# Patient Record
Sex: FEMALE | Employment: FULL TIME | URBAN - METROPOLITAN AREA
[De-identification: names, ages, dates, MRNs, and addresses within clinical notes are randomized per-mention and may not be internally consistent; named-entity substitution may affect disease eponyms.]

---

## 2021-07-07 ENCOUNTER — OFFICE VISIT (OUTPATIENT)
Dept: FAMILY MEDICINE | Facility: CLINIC | Age: 36
End: 2021-07-07
Payer: COMMERCIAL

## 2021-07-07 VITALS
DIASTOLIC BLOOD PRESSURE: 78 MMHG | RESPIRATION RATE: 16 BRPM | OXYGEN SATURATION: 99 % | SYSTOLIC BLOOD PRESSURE: 124 MMHG | TEMPERATURE: 97.7 F | BODY MASS INDEX: 20.58 KG/M2 | HEART RATE: 61 BPM | HEIGHT: 61 IN | WEIGHT: 109 LBS

## 2021-07-07 DIAGNOSIS — L84 CORNS OF MULTIPLE TOES: ICD-10-CM

## 2021-07-07 DIAGNOSIS — F41.9 ANXIETY: ICD-10-CM

## 2021-07-07 DIAGNOSIS — Z00.00 ROUTINE GENERAL MEDICAL EXAMINATION AT A HEALTH CARE FACILITY: Primary | ICD-10-CM

## 2021-07-07 DIAGNOSIS — M21.611 BILATERAL BUNIONS: ICD-10-CM

## 2021-07-07 DIAGNOSIS — Z12.4 SCREENING FOR CERVICAL CANCER: ICD-10-CM

## 2021-07-07 DIAGNOSIS — J45.20 ASTHMA, WELL CONTROLLED, MILD INTERMITTENT: ICD-10-CM

## 2021-07-07 DIAGNOSIS — M21.612 BILATERAL BUNIONS: ICD-10-CM

## 2021-07-07 DIAGNOSIS — T83.32XA INTRAUTERINE CONTRACEPTIVE DEVICE THREADS LOST, INITIAL ENCOUNTER: ICD-10-CM

## 2021-07-07 PROBLEM — Z97.5 IUD (INTRAUTERINE DEVICE) IN PLACE: Status: ACTIVE | Noted: 2021-07-07

## 2021-07-07 PROCEDURE — G0145 SCR C/V CYTO,THINLAYER,RESCR: HCPCS | Performed by: FAMILY MEDICINE

## 2021-07-07 PROCEDURE — 99213 OFFICE O/P EST LOW 20 MIN: CPT | Mod: 25 | Performed by: FAMILY MEDICINE

## 2021-07-07 PROCEDURE — 99385 PREV VISIT NEW AGE 18-39: CPT | Performed by: FAMILY MEDICINE

## 2021-07-07 PROCEDURE — 96127 BRIEF EMOTIONAL/BEHAV ASSMT: CPT | Performed by: FAMILY MEDICINE

## 2021-07-07 PROCEDURE — 87624 HPV HI-RISK TYP POOLED RSLT: CPT | Performed by: FAMILY MEDICINE

## 2021-07-07 RX ORDER — ALBUTEROL SULFATE 90 UG/1
2 AEROSOL, METERED RESPIRATORY (INHALATION) EVERY 6 HOURS PRN
Qty: 18 G | Refills: 1 | COMMUNITY
Start: 2021-07-07

## 2021-07-07 ASSESSMENT — ENCOUNTER SYMPTOMS
EYE PAIN: 0
DIARRHEA: 0
SORE THROAT: 0
PALPITATIONS: 0
FEVER: 0
FREQUENCY: 0
NERVOUS/ANXIOUS: 0
COUGH: 0
PARESTHESIAS: 0
MYALGIAS: 0
HEADACHES: 0
CHILLS: 0
HEMATOCHEZIA: 0
DIZZINESS: 0
BREAST MASS: 0
ARTHRALGIAS: 0
ABDOMINAL PAIN: 0
NAUSEA: 0
CONSTIPATION: 0
HEMATURIA: 0
SHORTNESS OF BREATH: 0
DYSURIA: 0
HEARTBURN: 0
JOINT SWELLING: 0
WEAKNESS: 0

## 2021-07-07 ASSESSMENT — ANXIETY QUESTIONNAIRES
GAD7 TOTAL SCORE: 5
3. WORRYING TOO MUCH ABOUT DIFFERENT THINGS: NOT AT ALL
1. FEELING NERVOUS, ANXIOUS, OR ON EDGE: SEVERAL DAYS
5. BEING SO RESTLESS THAT IT IS HARD TO SIT STILL: NOT AT ALL
6. BECOMING EASILY ANNOYED OR IRRITABLE: SEVERAL DAYS
7. FEELING AFRAID AS IF SOMETHING AWFUL MIGHT HAPPEN: MORE THAN HALF THE DAYS
IF YOU CHECKED OFF ANY PROBLEMS ON THIS QUESTIONNAIRE, HOW DIFFICULT HAVE THESE PROBLEMS MADE IT FOR YOU TO DO YOUR WORK, TAKE CARE OF THINGS AT HOME, OR GET ALONG WITH OTHER PEOPLE: SOMEWHAT DIFFICULT
2. NOT BEING ABLE TO STOP OR CONTROL WORRYING: SEVERAL DAYS

## 2021-07-07 ASSESSMENT — MIFFLIN-ST. JEOR: SCORE: 1118.86

## 2021-07-07 ASSESSMENT — PATIENT HEALTH QUESTIONNAIRE - PHQ9: 5. POOR APPETITE OR OVEREATING: NOT AT ALL

## 2021-07-07 NOTE — LETTER
My Asthma Action Plan    Name: Jovita Lim   YOB: 1985  Date: 7/7/2021   My doctor: Myah Edgar MD   My clinic: [facility]        My Rescue Medicine:   Albuterol inhaler (Proair/Ventolin/Proventil HFA)  2-4 puffs EVERY 4 HOURS as needed. Use a spacer if recommended by your provider.   My Asthma Severity:   Intermittent / Exercise Induced  Know your asthma triggers: upper respiratory infections and exercise or sports             GREEN ZONE   Good Control    I feel good    No cough or wheeze    Can work, sleep and play without asthma symptoms       Take your asthma control medicine every day.     1. If exercise triggers your asthma, take your rescue medication    15 minutes before exercise or sports, and    During exercise if you have asthma symptoms  2. Spacer to use with inhaler: If you have a spacer, make sure to use it with your inhaler             YELLOW ZONE Getting Worse  I have ANY of these:    I do not feel good    Cough or wheeze    Chest feels tight    Wake up at night   1. Keep taking your Green Zone medications  2. Start taking your rescue medicine:    every 20 minutes for up to 1 hour. Then every 4 hours for 24-48 hours.  3. If you stay in the Yellow Zone for more than 12-24 hours, contact your doctor.  4. If you do not return to the Green Zone in 12-24 hours or you get worse, start taking your oral steroid medicine if prescribed by your provider.           RED ZONE Medical Alert - Get Help  I have ANY of these:    I feel awful    Medicine is not helping    Breathing getting harder    Trouble walking or talking    Nose opens wide to breathe       1. Take your rescue medicine NOW  2. If your provider has prescribed an oral steroid medicine, start taking it NOW  3. Call your doctor NOW  4. If you are still in the Red Zone after 20 minutes and you have not reached your doctor:    Take your rescue medicine again and    Call 911 or go to the emergency  room right away    See your regular doctor within 2 weeks of an Emergency Room or Urgent Care visit for follow-up treatment.          Annual Reminders:  Meet with Asthma Educator,  Flu Shot in the Fall, consider Pneumonia Vaccination for patients with asthma (aged 19 and older).    Pharmacy: Data Unavailable    Electronically signed by Myah Edgar MD   Date: 07/07/21                    Asthma Triggers  How To Control Things That Make Your Asthma Worse    Triggers are things that make your asthma worse.  Look at the list below to help you find your triggers and   what you can do about them. You can help prevent asthma flare-ups by staying away from your triggers.      Trigger                                                          What you can do   Cigarette Smoke  Tobacco smoke can make asthma worse. Do not allow smoking in your home, car or around you.  Be sure no one smokes at a child s day care or school.  If you smoke, ask your health care provider for ways to help you quit.  Ask family members to quit too.  Ask your health care provider for a referral to Quit Plan to help you quit smoking, or call 3-858-092-PLAN.     Colds, Flu, Bronchitis  These are common triggers of asthma. Wash your hands often.  Don t touch your eyes, nose or mouth.  Get a flu shot every year.     Dust Mites  These are tiny bugs that live in cloth or carpet. They are too small to see. Wash sheets and blankets in hot water every week.   Encase pillows and mattress in dust mite proof covers.  Avoid having carpet if you can. If you have carpet, vacuum weekly.   Use a dust mask and HEPA vacuum.   Pollen and Outdoor Mold  Some people are allergic to trees, grass, or weed pollen, or molds. Try to keep your windows closed.  Limit time out doors when pollen count is high.   Ask you health care provider about taking medicine during allergy season.     Animal Dander  Some people are allergic to skin flakes, urine or saliva from pets with  fur or feathers. Keep pets with fur or feathers out of your home.    If you can t keep the pet outdoors, then keep the pet out of your bedroom.  Keep the bedroom door closed.  Keep pets off cloth furniture and away from stuffed toys.     Mice, Rats, and Cockroaches  Some people are allergic to the waste from these pests.   Cover food and garbage.  Clean up spills and food crumbs.  Store grease in the refrigerator.   Keep food out of the bedroom.   Indoor Mold  This can be a trigger if your home has high moisture. Fix leaking faucets, pipes, or other sources of water.   Clean moldy surfaces.  Dehumidify basement if it is damp and smelly.   Smoke, Strong Odors, and Sprays  These can reduce air quality. Stay away from strong odors and sprays, such as perfume, powder, hair spray, paints, smoke incense, paint, cleaning products, candles and new carpet.   Exercise or Sports  Some people with asthma have this trigger. Be active!  Ask your doctor about taking medicine before sports or exercise to prevent symptoms.    Warm up for 5-10 minutes before and after sports or exercise.     Other Triggers of Asthma  Cold air:  Cover your nose and mouth with a scarf.  Sometimes laughing or crying can be a trigger.  Some medicines and food can trigger asthma.

## 2021-07-07 NOTE — PROGRESS NOTES
SUBJECTIVE:   CC: Jovita Lim is an 36 year old woman who presents for preventive health visit.       Patient has been advised of split billing requirements and indicates understanding: Yes  Healthy Habits:     Getting at least 3 servings of Calcium per day:  Yes    Bi-annual eye exam:  NO    Dental care twice a year:  Yes    Sleep apnea or symptoms of sleep apnea:  None    Diet:  Regular (no restrictions)    Frequency of exercise:  4-5 days/week    Duration of exercise:  30-45 minutes    Taking medications regularly:  Yes    Medication side effects:  None    PHQ-2 Total Score: 0    Additional concerns today:  No  1. anxiety  She is a  , and realized that she has been dealing with anxiety and would like to get Referrals to therapist . She reports anxiety is usually related to work.  Its better because of summer break and school yr starts in September 21.  There is family history of bipolar depression in both parents- unsure what medications they take .  2. Toes pain.  she is a runner , asthma excerebration on marathon 2019- since then well controlled asthma on as needed  Use of albuterol  Lately has noted sores on toes on running.   3.history of asthma, induced by upper respiratiry tract infection .well controlled does not need refill on albuterol  4. Merina in place since 2015    Today's PHQ-2 Score:   PHQ-2 ( 1999 Pfizer) 7/7/2021   Q1: Little interest or pleasure in doing things 0   Q2: Feeling down, depressed or hopeless 0   PHQ-2 Score 0   Q1: Little interest or pleasure in doing things Not at all   Q2: Feeling down, depressed or hopeless Not at all   PHQ-2 Score 0       Abuse: Current or Past (Physical, Sexual or Emotional) - No  Do you feel safe in your environment? Yes    Have you ever done Advance Care Planning? (For example, a Health Directive, POLST, or a discussion with a medical provider or your loved ones about your wishes): No, advance care planning information  given to patient to review.  Advanced care planning was discussed at today's visit.    Social History     Tobacco Use     Smoking status: Never Smoker     Smokeless tobacco: Never Used   Substance Use Topics     Alcohol use: Yes     Comment: once  in a while      If you drink alcohol do you typically have >3 drinks per day or >7 drinks per week? No    Alcohol Use 7/7/2021   Prescreen: >3 drinks/day or >7 drinks/week? No   Prescreen: >3 drinks/day or >7 drinks/week? -   No flowsheet data found.    Reviewed orders with patient.  Reviewed health maintenance and updated orders accordingly - Yes  BP Readings from Last 3 Encounters:   07/07/21 124/78    Wt Readings from Last 3 Encounters:   07/07/21 49.4 kg (109 lb)                    Breast Cancer Screening:    Breast CA Risk Assessment (FHS-7) 7/7/2021 7/7/2021   Do you have a family history of breast, colon, or ovarian cancer? No / Unknown No / Unknown       click delete button to remove this line now    Pertinent mammograms are reviewed under the imaging tab.    History of abnormal Pap smear: NO - age 30- 65 PAP every 3 years recommended     Reviewed and updated as needed this visit by clinical staff  Tobacco   Meds   Med Hx  Surg Hx  Fam Hx  Soc Hx        Reviewed and updated as needed this visit by Provider                    Review of Systems   Constitutional: Negative for chills and fever.   HENT: Negative for congestion, ear pain, hearing loss and sore throat.    Eyes: Negative for pain and visual disturbance.   Respiratory: Negative for cough and shortness of breath.    Cardiovascular: Negative for chest pain, palpitations and peripheral edema.   Gastrointestinal: Negative for abdominal pain, constipation, diarrhea, heartburn, hematochezia and nausea.   Breasts:  Negative for tenderness, breast mass and discharge.   Genitourinary: Negative for dysuria, frequency, genital sores, hematuria, pelvic pain, urgency, vaginal bleeding and vaginal discharge.  "  Musculoskeletal: Negative for arthralgias, joint swelling and myalgias.   Skin: Negative for rash.   Neurological: Negative for dizziness, weakness, headaches and paresthesias.   Psychiatric/Behavioral: Negative for mood changes. The patient is not nervous/anxious.           OBJECTIVE:   /78   Pulse 61   Temp 97.7  F (36.5  C) (Skin)   Resp 16   Ht 1.537 m (5' 0.5\")   Wt 49.4 kg (109 lb)   LMP 07/05/2021   SpO2 99%   BMI 20.94 kg/m    Physical Exam  GENERAL: healthy, alert and no distress  EYES: Eyes grossly normal to inspection, PERRL and conjunctivae and sclerae normal  HENT: ear canals and TM's normal, nose and mouth without ulcers or lesions  NECK: no adenopathy, no asymmetry, masses, or scars and thyroid normal to palpation  RESP: lungs clear to auscultation - no rales, rhonchi or wheezes  BREAST: normal without masses, tenderness or nipple discharge and no palpable axillary masses or adenopathy  CV: regular rate and rhythm, normal S1 S2, no S3 or S4, no murmur, click or rub, no peripheral edema and peripheral pulses strong  ABDOMEN: soft, nontender, no hepatosplenomegaly, no masses and bowel sounds normal   (female): normal female external genitalia, normal urethral meatus, vaginal mucosa pink, moist, well rugated, and normal cervix/adnexa/uterus without masses or discharge. Pap is sent and IUD string not visualized   MS:bilateral hallus varus deformity R > L R4th tie with corn/callous mediallly  SKIN: no suspicious lesions or rashes  NEURO: Normal strength and tone, mentation intact and speech normal  PSYCH: mentation appears normal, affect normal/bright    Diagnostic Test Results:  Labs reviewed in Epic  none     ASSESSMENT/PLAN:   Jovita was seen today for physical.    Diagnoses and all orders for this visit:    Routine general medical examination at a health care facility  CBE- normal   baseline mammogram can be in next 5 yrs  HIV/HCV screening advised- deferred by patient- " "  Anxiety  Comments:  Mild anxiety- start counseling  Orders:  -     EMOTIONAL / BEHAVIORAL ASSESSMENT  -     MENTAL HEALTH REFERRAL  - Adult; Outpatient Treatment; Individual/Couples/Family/Group Therapy/Health Psychology; [facility]: [facility] [phone]; We will contact you to schedule the appointment or please call with any questions  ESTEFANÍA-7 SCORE 7/7/2021   Total Score 5       Asthma, well controlled, mild intermittent  Comments:  ACT/AAP- completed. asthma well controlled. she reports she does not need refill on albuterol    Corns of multiple toes  -     Orthopedic  Referral; Future    Bilateral bunions  -  Advised to wear wide shoes  Has been using HOKA new shoes    Orthopedic  Referral; Future    Intrauterine contraceptive device threads lost, initial encounter  Comments:  Merina in place since 2015. need separate rg for removal and plan for reinsertionif that is what she desires   Orders:  -     US Pelvic Complete with Transvaginal; Future    Screening for cervical cancer  Comments:  pap with HOV- if NIL/- repeat ok in 5 yr  Orders:  -     Pap imaged thin layer screen with HPV - recommended age 30 - 65 years (select HPV order below)  -     HPV High Risk Types DNA Cervical        Patient has been advised of split billing requirements and indicates understanding: Yes  COUNSELING:  Reviewed preventive health counseling, as reflected in patient instructions       Regular exercise       Healthy diet/nutrition       Vision screening       Hearing screening       Alcohol Use       Contraception       Folic Acid    Estimated body mass index is 20.94 kg/m  as calculated from the following:    Height as of this encounter: 1.537 m (5' 0.5\").    Weight as of this encounter: 49.4 kg (109 lb).        She reports that she has never smoked. She has never used smokeless tobacco.      Counseling Resources:  ATP IV Guidelines  Pooled Cohorts Equation Calculator  Breast Cancer Risk " Calculator  BRCA-Related Cancer Risk Assessment: FHS-7 Tool  FRAX Risk Assessment  ICSI Preventive Guidelines  Dietary Guidelines for Americans, 2010  [employer]'s MyPlate  ASA Prophylaxis  Lung CA Screening    Myah Edgar MD  [facility]

## 2021-07-08 ASSESSMENT — ANXIETY QUESTIONNAIRES: GAD7 TOTAL SCORE: 5

## 2021-07-08 ASSESSMENT — ASTHMA QUESTIONNAIRES: ACT_TOTALSCORE: 25

## 2021-07-09 LAB
COPATH REPORT: NORMAL
PAP: NORMAL

## 2021-07-12 LAB
FINAL DIAGNOSIS: NORMAL
HPV HR 12 DNA CVX QL NAA+PROBE: NEGATIVE
HPV16 DNA SPEC QL NAA+PROBE: NEGATIVE
HPV18 DNA SPEC QL NAA+PROBE: NEGATIVE
SPECIMEN DESCRIPTION: NORMAL
SPECIMEN SOURCE CVX/VAG CYTO: NORMAL

## 2021-07-19 ENCOUNTER — ANCILLARY PROCEDURE (OUTPATIENT)
Dept: ULTRASOUND IMAGING | Facility: CLINIC | Age: 36
End: 2021-07-19
Attending: FAMILY MEDICINE
Payer: COMMERCIAL

## 2021-07-19 DIAGNOSIS — T83.32XA INTRAUTERINE CONTRACEPTIVE DEVICE THREADS LOST, INITIAL ENCOUNTER: ICD-10-CM

## 2021-07-19 PROCEDURE — 76856 US EXAM PELVIC COMPLETE: CPT | Performed by: OBSTETRICS & GYNECOLOGY

## 2021-07-19 PROCEDURE — 76830 TRANSVAGINAL US NON-OB: CPT | Performed by: OBSTETRICS & GYNECOLOGY

## 2021-08-06 ENCOUNTER — OFFICE VISIT (OUTPATIENT)
Dept: PODIATRY | Facility: CLINIC | Age: 36
End: 2021-08-06
Attending: FAMILY MEDICINE
Payer: COMMERCIAL

## 2021-08-06 VITALS
HEIGHT: 61 IN | WEIGHT: 109 LBS | BODY MASS INDEX: 20.58 KG/M2 | DIASTOLIC BLOOD PRESSURE: 72 MMHG | SYSTOLIC BLOOD PRESSURE: 120 MMHG

## 2021-08-06 DIAGNOSIS — M21.611 BILATERAL BUNIONS: ICD-10-CM

## 2021-08-06 DIAGNOSIS — M21.612 BILATERAL BUNIONS: ICD-10-CM

## 2021-08-06 DIAGNOSIS — L84 CORNS OF MULTIPLE TOES: ICD-10-CM

## 2021-08-06 PROCEDURE — 99203 OFFICE O/P NEW LOW 30 MIN: CPT | Performed by: PODIATRIST

## 2021-08-06 ASSESSMENT — MIFFLIN-ST. JEOR: SCORE: 1118.86

## 2021-08-06 NOTE — PATIENT INSTRUCTIONS
Thank you for choosing [facility] Podiatry / Foot & Ankle Surgery!    DR SAENZ'S CLINIC LOCATIONS  [facility]   [address] [facility] [address]   [place] [address] [place], [place] [id number] [phone]       SET UP SURGERY: [phone]    APPOINTMENTS: [phone]    BILLING QUESTIONS: [phone]    FAX NUMBER: [phone]        Follow up: as needed      CALLUS / CORN / IPK CARE  When there is excessive friction or pressure on the skin, the body responds by making the skin thicker to protect the deeper structures from becoming exposed. While this works well to protect the deeper structures, the thickened skin can cause increased pressure and pain.    Callus: flat, diffuse thickened areas are simple calluses and they are usually caused by friction. Often these are the result of rubbing on a shoe or from going barefoot.    Corns: calluses with a central core on or between the toes are called corns. These result from prominent joints on toes rubbing together. Theses are a symptom of bone malalignment or illfitting shoes and will always recur unless the underlying bones are addressed surgically.    IPK: calluses with a central core on the ball of the foot are usually IPKs (intractable plantar keratosis). These are caused by excessive pressure from the metatarsals, the bones that make up the ball of the foot. Often one of these bones is too long or too prominent. Again, these will always recur unless the underlying bone issue is addressed. There is no cure for these. They will either go away by themselves, recur, or more could develop.    TREATMENT RECOMENDATIONS  - File: Trim them down with a pumice stone or callus file a couple times a week to remove callus tissue that builds up. An electric callus removing device. Amope Pedi Perfect Electronic Pedicure Foot File and Callus Remover can be a good option.   - Moisturize: Lotion can be applied to soften the  callus. A lactic acid or urea based cream such as Carmol, Kersal or Vanicream or thicker cream with shea butter are good options.   - Foot Gear: Good supportive shoes and minimizing barefoot walking can slow down callus formation and can decrease pain levels. Gel inserts can also provide padding to the bottom of the foot to prevent pain and slow recurrence. Toe spacers, toe covers, can custom orthotic inserts can be beneficial as well.  - Surgery: If there is a surgical pathology noted, such as a prominent bone, often this needs to be addressed surgically to minimize recurrence. However, sometimes the lesion simply migrates to another spot after surgery, so it is not a guaranteed cure.    [email or web address] or call 3-677-PEDIFIX  TOE COVERS/CAPS

## 2021-08-06 NOTE — PROGRESS NOTES
"Foot & Ankle Surgery  August 6, 2021    CC: \" Bunion/calluses between toes\"    I was asked to see Jovita Lim regarding the chief complaint by:  Dr. Edgar    HPI:  Pt is a 36 year old female who presents with above complaint.  See her history of bilateral lower extremity issues.  She is looking for \"prevention -education\".  She is tried a corn pad which helped alleviate pressure and now \"not hurting\".  Painful callus at the lateral right fourth toe.  \"So inflamed\".  Can \"travel\" to other toes.    ROS:   Pos for CC.  The patient denies current nausea, vomiting, chills, fevers, belly pain, calf pain, chest pain or SOB.  Complete remainder of ROS is otherwise neg.    VITALS:    Vitals:    08/06/21 1036   BP: 120/72   Weight: 49.4 kg (109 lb)   Height: 1.537 m (5' 0.5\")       PMH: Negative for diabetes, hypertension, anxiety    SXHX: No previous surgeries noted    MEDS:    Current Outpatient Medications   Medication     albuterol (PROAIR HFA/PROVENTIL HFA/VENTOLIN HFA) 108 (90 Base) MCG/ACT inhaler     levonorgestrel (MIRENA) 20 MCG/24HR IUD     No current facility-administered medications for this visit.       ALL:   No Known Allergies    FMH: Negative for diabetes, rheumatoid arthritis    SocHx:    Social History     Socioeconomic History     Marital status: Single     Spouse name: Not on file     Number of children: Not on file     Years of education: Not on file     Highest education level: Not on file   Occupational History     Not on file   Tobacco Use     Smoking status: Never Smoker     Smokeless tobacco: Never Used   Substance and Sexual Activity     Alcohol use: Yes     Comment: once  in a while      Drug use: Never     Sexual activity: Yes     Partners: Male     Birth control/protection: I.U.D.   Other Topics Concern     Not on file   Social History Narrative     Not on file     Social Determinants of Health     Financial Resource Strain:      Difficulty of Paying Living Expenses:    Food " Insecurity:      Worried About Running Out of Food in the Last Year:      Ran Out of Food in the Last Year:    Transportation Needs:      Lack of Transportation (Medical):      Lack of Transportation (Non-Medical):    Physical Activity:      Days of Exercise per Week:      Minutes of Exercise per Session:    Stress:      Feeling of Stress :    Social Connections:      Frequency of Communication with Friends and Family:      Frequency of Social Gatherings with Friends and Family:      Attends Alevism Services:      Active Member of Clubs or Organizations:      Attends Club or Organization Meetings:      Marital Status:    Intimate Partner Violence:      Fear of Current or Ex-Partner:      Emotionally Abused:      Physically Abused:      Sexually Abused:            EXAMINATION:  Gen:   No apparent distress  Neuro:   A&Ox3, no deficits  Psych:    Answering questions appropriately for age and situation with normal affect  Head:    NCAT  Eye:    Visual scanning without deficit  Ear:    Response to auditory stimuli wnl  Lung:    Non-labored breathing on RA noted  Abd:    NTND per patient report  Lymph:    Neg for pitting/non-pitting edema BLE  Vasc:    Pulses palpable, CFT minimally delayed  Neuro:    Light touch sensation intact to all sensory nerve distributions without paresthesias  Derm:    Mild callus at the lateral aspect of the right fourth toe without underlying ulceration.  MSK:    Bilateral bunion deformities noted.  Patient notes that these are not painful and there is no acute pathology  Calf:    Neg for redness, swelling or tenderness      Assessment:  36 year old female with painful hyperkeratotic lesion lateral right fourth toe with setting of minimally symptomatic bilateral bunion deformities      Plan:  Discussed etiologies, anatomy and options  1. Painful preulcerative lesion lateral right fourth toe in setting of bunion  -I personally reviewed and interpreted the patient's lower extremity history  pertinent to today's visit, including imaging/labs, in preparation for initiating a treatment program.  -The lesion was debrided with a 15 blade without incident; no charge  -Advised accommodative shoes with sufficiently wide toe box to minimize toe impingement  -Toe cap/cover, spacer options discussed and information dispensed  -Discussed the role her bunion can pay in the increased interdigital pressure  -Her bunion is currently not symptomatic so I advised against any intervention    Follow up: As needed or sooner with acute issues      Patient's medical history was reviewed today      Julián Reza DPM [facility] FACFAOM  Podiatric Foot & Ankle Surgeon  [facility]  [phone]    Disclaimer: This note consists of symbols derived from keyboarding, dictation and/or voice recognition software. As a result, there may be errors in the script that have gone undetected. Please consider this when interpreting information found in this chart.

## 2021-11-21 ENCOUNTER — HEALTH MAINTENANCE LETTER (OUTPATIENT)
Age: 36
End: 2021-11-21

## 2022-08-28 ENCOUNTER — HEALTH MAINTENANCE LETTER (OUTPATIENT)
Age: 37
End: 2022-08-28

## 2023-01-14 ENCOUNTER — HEALTH MAINTENANCE LETTER (OUTPATIENT)
Age: 38
End: 2023-01-14

## 2023-09-24 ENCOUNTER — HEALTH MAINTENANCE LETTER (OUTPATIENT)
Age: 38
End: 2023-09-24

## 2024-01-01 NOTE — TELEPHONE ENCOUNTER
DIAGNOSIS: Bilateral Bunion   APPOINTMENT DATE: 01/23/2024   NOTES STATUS DETAILS   OFFICE NOTE from referring provider SELF     OFFICE NOTE from other specialist Internal 08/06/2021- Julián Reza DPM - [facility] Podiatry

## 2024-01-17 DIAGNOSIS — M79.673 FOOT PAIN: Primary | ICD-10-CM

## 2024-01-23 ENCOUNTER — PRE VISIT (OUTPATIENT)
Dept: ORTHOPEDICS | Facility: CLINIC | Age: 39
End: 2024-01-23

## 2024-01-23 ENCOUNTER — OFFICE VISIT (OUTPATIENT)
Dept: ORTHOPEDICS | Facility: CLINIC | Age: 39
End: 2024-01-23
Payer: COMMERCIAL

## 2024-01-23 ENCOUNTER — ANCILLARY PROCEDURE (OUTPATIENT)
Dept: GENERAL RADIOLOGY | Facility: CLINIC | Age: 39
End: 2024-01-23
Attending: ORTHOPAEDIC SURGERY
Payer: COMMERCIAL

## 2024-01-23 DIAGNOSIS — M79.673 FOOT PAIN: ICD-10-CM

## 2024-01-23 DIAGNOSIS — M25.571 PAIN IN JOINT, ANKLE AND FOOT, RIGHT: Primary | ICD-10-CM

## 2024-01-23 DIAGNOSIS — M25.572 PAIN IN JOINT, ANKLE AND FOOT, LEFT: ICD-10-CM

## 2024-01-23 PROCEDURE — 99203 OFFICE O/P NEW LOW 30 MIN: CPT | Performed by: ORTHOPAEDIC SURGERY

## 2024-01-23 PROCEDURE — 73630 X-RAY EXAM OF FOOT: CPT | Mod: LT | Performed by: RADIOLOGY

## 2024-01-23 NOTE — LETTER
1/23/2024         RE: Jovita Lim  [address]  [place] [address]        Dear Colleague,    Thank you for referring your patient, Jovita Lim, to the [facility] [place]. Please see a copy of my visit note below.    Chief complaint: Bilateral foot pain right worse than left    Patient is a 38-year-old female who presents today for evaluation of her feet.  Patient reports to be an avid runner and to have training in the past for an ultramarathon.  Reports to have pain and discomfort with regular activities.  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  She reports to have running is her main and recreational activity which she reports to be extremely good for her not just physically but also for her mental health.  She is very clear about the fact that she does not have any pain while running but she struggles to control the discomfort on her toes with regular activities while being at the school or socializing.  Reports to have pain in between the toes first and second as well as in between the fourth and fifth.  She has tried toe spacers without any significant success.    From a running perspective she will reports to run between 30 and 40 miles a week.    We reviewed today her past medical and surgical history current medications and drug allergies.    And to visit the patient is a very pleasant female in no apparent distress.  Denies to have any constitutional symptoms.  Reports to have a weight of 109 pounds.    On today's exam she presents with full range of motion of the right ankle hindfoot and midfoot joints skin is intact skin is intact presents with a hypermobile first ray.  There is a combined plantar and dorsiflexion of approximately 45 degrees across the first MTP joint tip.  There is a slight bunionette as well.    Plan x-rays were reviewed which are significant for showing a non congruent bunion deformity with a fairly  prominent lateral eminence for the fifth metatarsal head otherwise there is no acute findings.    Assessment: Right foot bunion and bunionette deformity    Plan: I discussed with the patient that at this point given the fact that she is so active with her running I would discourage her to undergo any procedure.  We discussed the possibility of using silicone toe sleeves as a way to alleviate her symptoms but I encouraged her to visit with us once her running distance has significantly decreased.  I do not feel that she will be able to run 30 miles plus a week after bunion corrective surgery.    All questions are answered.  Patient is pleased with the discussion.  She will follow-up on as needed basis.  She has no restrictions.    TT 30 minutes        Jay Andujar MD

## 2024-01-23 NOTE — NURSING NOTE
Reason For Visit:   Chief Complaint   Patient presents with    Consult     Bilateral bunions and bunionettes. 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 Equal bilaterally. Severity depends on the season.       There were no vitals taken for this visit.         Evelyn Cardoso, ATC

## 2024-01-24 NOTE — PROGRESS NOTES
Chief complaint: Bilateral foot pain right worse than left    Patient is a 38-year-old female who presents today for evaluation of her feet.  Patient reports to be an avid runner and to have training in the past for an ultramarathon.  Reports to have pain and discomfort with regular activities.  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  She reports to have running is her main and recreational activity which she reports to be extremely good for her not just physically but also for her mental health.  She is very clear about the fact that she does not have any pain while running but she struggles to control the discomfort on her toes with regular activities while being at the school or socializing.  Reports to have pain in between the toes first and second as well as in between the fourth and fifth.  She has tried toe spacers without any significant success.    From a running perspective she will reports to run between 30 and 40 miles a week.    We reviewed today her past medical and surgical history current medications and drug allergies.    And to visit the patient is a very pleasant female in no apparent distress.  Denies to have any constitutional symptoms.  Reports to have a weight of 109 pounds.    On today's exam she presents with full range of motion of the right ankle hindfoot and midfoot joints skin is intact skin is intact presents with a hypermobile first ray.  There is a combined plantar and dorsiflexion of approximately 45 degrees across the first MTP joint tip.  There is a slight bunionette as well.    Plan x-rays were reviewed which are significant for showing a non congruent bunion deformity with a fairly prominent lateral eminence for the fifth metatarsal head otherwise there is no acute findings.    Assessment: Right foot bunion and bunionette deformity    Plan: I discussed with the patient that at this point given the fact that she is so active with her running I would discourage her  to undergo any procedure.  We discussed the possibility of using silicone toe sleeves as a way to alleviate her symptoms but I encouraged her to visit with us once her running distance has significantly decreased.  I do not feel that she will be able to run 30 miles plus a week after bunion corrective surgery.    All questions are answered.  Patient is pleased with the discussion.  She will follow-up on as needed basis.  She has no restrictions.    TT 30 minutes

## 2024-11-17 ENCOUNTER — HEALTH MAINTENANCE LETTER (OUTPATIENT)
Age: 39
End: 2024-11-17